# Patient Record
Sex: FEMALE | Race: WHITE | Employment: UNEMPLOYED | URBAN - METROPOLITAN AREA
[De-identification: names, ages, dates, MRNs, and addresses within clinical notes are randomized per-mention and may not be internally consistent; named-entity substitution may affect disease eponyms.]

---

## 2019-04-29 ENCOUNTER — HOSPITAL ENCOUNTER (INPATIENT)
Facility: CLINIC | Age: 54
LOS: 11 days | Discharge: NURSING, REHAB OR LONG-TERM CARE | DRG: 885 | End: 2019-05-10
Attending: PSYCHIATRY & NEUROLOGY | Admitting: PSYCHIATRY & NEUROLOGY
Payer: COMMERCIAL

## 2019-04-29 ENCOUNTER — TELEPHONE (OUTPATIENT)
Dept: BEHAVIORAL HEALTH | Facility: CLINIC | Age: 54
End: 2019-04-29
Payer: COMMERCIAL

## 2019-04-29 DIAGNOSIS — F20.0 ACUTE EXACERBATION OF CHRONIC PARANOID SCHIZOPHRENIA (H): ICD-10-CM

## 2019-04-29 DIAGNOSIS — F20.0 PARANOID SCHIZOPHRENIA (H): Primary | ICD-10-CM

## 2019-04-29 PROBLEM — F29 PSYCHOSIS (H): Status: ACTIVE | Noted: 2019-04-29

## 2019-04-29 LAB
ALBUMIN SERPL-MCNC: 3.6 G/DL (ref 3.4–5)
ALBUMIN UR-MCNC: NEGATIVE MG/DL
ALP SERPL-CCNC: 86 U/L (ref 40–150)
ALT SERPL W P-5'-P-CCNC: 18 U/L (ref 0–50)
AMPHETAMINES UR QL SCN: NEGATIVE
ANION GAP SERPL CALCULATED.3IONS-SCNC: 10 MMOL/L (ref 3–14)
APPEARANCE UR: CLEAR
AST SERPL W P-5'-P-CCNC: 13 U/L (ref 0–45)
BARBITURATES UR QL: NEGATIVE
BASOPHILS # BLD AUTO: 0 10E9/L (ref 0–0.2)
BASOPHILS NFR BLD AUTO: 0.2 %
BENZODIAZ UR QL: NEGATIVE
BILIRUB SERPL-MCNC: 0.6 MG/DL (ref 0.2–1.3)
BILIRUB UR QL STRIP: NEGATIVE
BUN SERPL-MCNC: 14 MG/DL (ref 7–30)
CALCIUM SERPL-MCNC: 9.1 MG/DL (ref 8.5–10.1)
CANNABINOIDS UR QL SCN: NEGATIVE
CHLORIDE SERPL-SCNC: 102 MMOL/L (ref 94–109)
CO2 SERPL-SCNC: 28 MMOL/L (ref 20–32)
COCAINE UR QL: NEGATIVE
COLOR UR AUTO: YELLOW
CREAT SERPL-MCNC: 0.59 MG/DL (ref 0.52–1.04)
DIFFERENTIAL METHOD BLD: ABNORMAL
EOSINOPHIL # BLD AUTO: 0 10E9/L (ref 0–0.7)
EOSINOPHIL NFR BLD AUTO: 0.2 %
ERYTHROCYTE [DISTWIDTH] IN BLOOD BY AUTOMATED COUNT: 14.2 % (ref 10–15)
ETHANOL UR QL SCN: NEGATIVE
GFR SERPL CREATININE-BSD FRML MDRD: >90 ML/MIN/{1.73_M2}
GLUCOSE SERPL-MCNC: 101 MG/DL (ref 70–99)
GLUCOSE UR STRIP-MCNC: NEGATIVE MG/DL
HCT VFR BLD AUTO: 38.7 % (ref 35–47)
HGB BLD-MCNC: 12.6 G/DL (ref 11.7–15.7)
HGB UR QL STRIP: ABNORMAL
IMM GRANULOCYTES # BLD: 0 10E9/L (ref 0–0.4)
IMM GRANULOCYTES NFR BLD: 0.2 %
KETONES UR STRIP-MCNC: NEGATIVE MG/DL
LEUKOCYTE ESTERASE UR QL STRIP: ABNORMAL
LYMPHOCYTES # BLD AUTO: 2.1 10E9/L (ref 0.8–5.3)
LYMPHOCYTES NFR BLD AUTO: 16.2 %
MCH RBC QN AUTO: 30.4 PG (ref 26.5–33)
MCHC RBC AUTO-ENTMCNC: 32.6 G/DL (ref 31.5–36.5)
MCV RBC AUTO: 94 FL (ref 78–100)
MONOCYTES # BLD AUTO: 1.1 10E9/L (ref 0–1.3)
MONOCYTES NFR BLD AUTO: 9 %
MUCOUS THREADS #/AREA URNS LPF: PRESENT /LPF
NEUTROPHILS # BLD AUTO: 9.5 10E9/L (ref 1.6–8.3)
NEUTROPHILS NFR BLD AUTO: 74.2 %
NITRATE UR QL: NEGATIVE
NRBC # BLD AUTO: 0 10*3/UL
NRBC BLD AUTO-RTO: 0 /100
OPIATES UR QL SCN: NEGATIVE
PH UR STRIP: 6.5 PH (ref 5–7)
PLATELET # BLD AUTO: 399 10E9/L (ref 150–450)
POTASSIUM SERPL-SCNC: 3.9 MMOL/L (ref 3.4–5.3)
PROT SERPL-MCNC: 7.7 G/DL (ref 6.8–8.8)
RBC # BLD AUTO: 4.14 10E12/L (ref 3.8–5.2)
RBC #/AREA URNS AUTO: 4 /HPF (ref 0–2)
SODIUM SERPL-SCNC: 140 MMOL/L (ref 133–144)
SOURCE: ABNORMAL
SP GR UR STRIP: 1.01 (ref 1–1.03)
SQUAMOUS #/AREA URNS AUTO: 1 /HPF (ref 0–1)
TSH SERPL DL<=0.005 MIU/L-ACNC: 0.54 MU/L (ref 0.4–4)
UROBILINOGEN UR STRIP-MCNC: 2 MG/DL (ref 0–2)
WBC # BLD AUTO: 12.7 10E9/L (ref 4–11)
WBC #/AREA URNS AUTO: 4 /HPF (ref 0–5)

## 2019-04-29 PROCEDURE — 25000132 ZZH RX MED GY IP 250 OP 250 PS 637: Performed by: PSYCHIATRY & NEUROLOGY

## 2019-04-29 PROCEDURE — 80320 DRUG SCREEN QUANTALCOHOLS: CPT | Performed by: PSYCHIATRY & NEUROLOGY

## 2019-04-29 PROCEDURE — A9270 NON-COVERED ITEM OR SERVICE: HCPCS | Performed by: PSYCHIATRY & NEUROLOGY

## 2019-04-29 PROCEDURE — 99285 EMERGENCY DEPT VISIT HI MDM: CPT | Mod: Z6 | Performed by: PSYCHIATRY & NEUROLOGY

## 2019-04-29 PROCEDURE — 36415 COLL VENOUS BLD VENIPUNCTURE: CPT | Performed by: PSYCHIATRY & NEUROLOGY

## 2019-04-29 PROCEDURE — 80053 COMPREHEN METABOLIC PANEL: CPT | Performed by: PSYCHIATRY & NEUROLOGY

## 2019-04-29 PROCEDURE — 84443 ASSAY THYROID STIM HORMONE: CPT | Performed by: PSYCHIATRY & NEUROLOGY

## 2019-04-29 PROCEDURE — 99285 EMERGENCY DEPT VISIT HI MDM: CPT | Mod: 25 | Performed by: PSYCHIATRY & NEUROLOGY

## 2019-04-29 PROCEDURE — 81001 URINALYSIS AUTO W/SCOPE: CPT | Performed by: PSYCHIATRY & NEUROLOGY

## 2019-04-29 PROCEDURE — 12400001 ZZH R&B MH UMMC

## 2019-04-29 PROCEDURE — 85025 COMPLETE CBC W/AUTO DIFF WBC: CPT | Performed by: PSYCHIATRY & NEUROLOGY

## 2019-04-29 PROCEDURE — 80307 DRUG TEST PRSMV CHEM ANLYZR: CPT | Performed by: PSYCHIATRY & NEUROLOGY

## 2019-04-29 PROCEDURE — 87591 N.GONORRHOEAE DNA AMP PROB: CPT | Performed by: PSYCHIATRY & NEUROLOGY

## 2019-04-29 PROCEDURE — 90791 PSYCH DIAGNOSTIC EVALUATION: CPT

## 2019-04-29 PROCEDURE — 87491 CHLMYD TRACH DNA AMP PROBE: CPT | Performed by: PSYCHIATRY & NEUROLOGY

## 2019-04-29 RX ORDER — ALUMINA, MAGNESIA, AND SIMETHICONE 2400; 2400; 240 MG/30ML; MG/30ML; MG/30ML
30 SUSPENSION ORAL EVERY 4 HOURS PRN
Status: DISCONTINUED | OUTPATIENT
Start: 2019-04-29 | End: 2019-05-10 | Stop reason: HOSPADM

## 2019-04-29 RX ORDER — NICOTINE 21 MG/24HR
1 PATCH, TRANSDERMAL 24 HOURS TRANSDERMAL DAILY
Status: DISCONTINUED | OUTPATIENT
Start: 2019-04-29 | End: 2019-05-10 | Stop reason: HOSPADM

## 2019-04-29 RX ORDER — OLANZAPINE 10 MG/2ML
5-10 INJECTION, POWDER, FOR SOLUTION INTRAMUSCULAR
Status: DISCONTINUED | OUTPATIENT
Start: 2019-04-29 | End: 2019-05-07

## 2019-04-29 RX ORDER — HYDROXYZINE HYDROCHLORIDE 25 MG/1
25 TABLET, FILM COATED ORAL EVERY 4 HOURS PRN
Status: DISCONTINUED | OUTPATIENT
Start: 2019-04-29 | End: 2019-05-07

## 2019-04-29 RX ORDER — ACETAMINOPHEN 325 MG/1
650 TABLET ORAL EVERY 4 HOURS PRN
Status: DISCONTINUED | OUTPATIENT
Start: 2019-04-29 | End: 2019-05-10 | Stop reason: HOSPADM

## 2019-04-29 RX ORDER — OLANZAPINE 10 MG/1
10 TABLET, ORALLY DISINTEGRATING ORAL ONCE
Status: COMPLETED | OUTPATIENT
Start: 2019-04-29 | End: 2019-04-29

## 2019-04-29 RX ORDER — BISACODYL 10 MG
10 SUPPOSITORY, RECTAL RECTAL DAILY PRN
Status: DISCONTINUED | OUTPATIENT
Start: 2019-04-29 | End: 2019-05-10 | Stop reason: HOSPADM

## 2019-04-29 RX ORDER — ALPRAZOLAM 0.5 MG
0.5 TABLET ORAL 3 TIMES DAILY PRN
Status: ON HOLD | COMMUNITY
End: 2019-05-09

## 2019-04-29 RX ORDER — NICOTINE 21 MG/24HR
1 PATCH, TRANSDERMAL 24 HOURS TRANSDERMAL ONCE
Status: COMPLETED | OUTPATIENT
Start: 2019-04-29 | End: 2019-04-29

## 2019-04-29 RX ORDER — TRAZODONE HYDROCHLORIDE 50 MG/1
50 TABLET, FILM COATED ORAL
Status: DISCONTINUED | OUTPATIENT
Start: 2019-04-29 | End: 2019-05-10 | Stop reason: HOSPADM

## 2019-04-29 RX ORDER — OLANZAPINE 5 MG/1
5-10 TABLET ORAL
Status: DISCONTINUED | OUTPATIENT
Start: 2019-04-29 | End: 2019-05-07

## 2019-04-29 RX ORDER — POLYETHYLENE GLYCOL 3350 17 G
2 POWDER IN PACKET (EA) ORAL
Status: DISCONTINUED | OUTPATIENT
Start: 2019-04-29 | End: 2019-04-29

## 2019-04-29 RX ORDER — POLYETHYLENE GLYCOL 3350 17 G
2 POWDER IN PACKET (EA) ORAL
Status: DISCONTINUED | OUTPATIENT
Start: 2019-04-29 | End: 2019-05-10 | Stop reason: HOSPADM

## 2019-04-29 RX ADMIN — NICOTINE 1 PATCH: 21 PATCH, EXTENDED RELEASE TRANSDERMAL at 16:21

## 2019-04-29 RX ADMIN — OLANZAPINE 10 MG: 5 TABLET, FILM COATED ORAL at 20:37

## 2019-04-29 RX ADMIN — OLANZAPINE 10 MG: 10 TABLET, ORALLY DISINTEGRATING ORAL at 15:30

## 2019-04-29 SDOH — HEALTH STABILITY: MENTAL HEALTH: HOW OFTEN DO YOU HAVE A DRINK CONTAINING ALCOHOL?: NEVER

## 2019-04-29 ASSESSMENT — ACTIVITIES OF DAILY LIVING (ADL)
DRESS: INDEPENDENT
AMBULATION: 0-->INDEPENDENT
LAUNDRY: WITH SUPERVISION
TRANSFERRING: 0-->INDEPENDENT
HYGIENE/GROOMING: INDEPENDENT
TOILETING: 0-->INDEPENDENT
SWALLOWING: 0-->SWALLOWS FOODS/LIQUIDS WITHOUT DIFFICULTY
RETIRED_EATING: 0-->INDEPENDENT
DRESS: 0-->INDEPENDENT
ORAL_HYGIENE: INDEPENDENT
COGNITION: 2 - DIFFICULTY WITH ORGANIZING THOUGHTS
BATHING: 0-->INDEPENDENT
RETIRED_COMMUNICATION: 3-->UNABLE TO UNDERSTAND OR SPEAK (NOT RELATED TO LANGUAGE BARRIER)
FALL_HISTORY_WITHIN_LAST_SIX_MONTHS: NO
WHICH_OF_THE_ABOVE_FUNCTIONAL_RISKS_HAD_A_RECENT_ONSET_OR_CHANGE?: COMMUNICATION/SPEECH;COGNITION

## 2019-04-29 ASSESSMENT — MIFFLIN-ST. JEOR: SCORE: 1047.67

## 2019-04-29 ASSESSMENT — ENCOUNTER SYMPTOMS
RESPIRATORY NEGATIVE: 1
NEUROLOGICAL NEGATIVE: 1
NERVOUS/ANXIOUS: 1
APPETITE CHANGE: 1
ENDOCRINE NEGATIVE: 1
SLEEP DISTURBANCE: 1
ACTIVITY CHANGE: 1
HEMATOLOGIC/LYMPHATIC NEGATIVE: 1
DECREASED CONCENTRATION: 1
EYES NEGATIVE: 1
MUSCULOSKELETAL NEGATIVE: 1
CARDIOVASCULAR NEGATIVE: 1
GASTROINTESTINAL NEGATIVE: 1
HALLUCINATIONS: 1

## 2019-04-29 NOTE — ED NOTES
ED to Behavioral Floor Handoff    SITUATION  Maryan Goldstein is a 53 year old female who speaks English and lives is homeless unknown The patient arrived in the ED by ambulance [facility] with a complaint of Paranoid (Hearing voices) and Mental Health Problem  .The patient's current symptoms started/worsened an unknown time ago pt found outside of a [employer] and just moved here from [place].   In the ED, pt was diagnosed with   Final diagnoses:   Acute exacerbation of chronic paranoid schizophrenia (H)        Initial vitals were: BP: 111/65  Pulse: 114  Temp: 97.7  F (36.5  C)  Resp: 16  SpO2: 98 %   --------  Is the patient diabetic? No   If yes, last blood glucose? --     If yes, was this treated in the ED? --  --------  Is the patient inebriated (ETOH) No or Impaired on other substances? No  MSSA done? N/A  Last MSSA score: --    Were withdrawal symptoms treated? N/A  Does the patient have a seizure history? No. If yes, date of most recent seizure--  --------  Is the patient patient experiencing suicidal ideation? Refuses to answer at this time  Homicidal ideation? denies current or recent homicidal ideation or behaviors.    Self-injurious behavior/urges? denies current or recent self injurious behavior or ideation.  ------  Was pt aggressive in the ED No  Was a code called No  Is the pt now cooperative? Yes  -------  Meds given in ED:   Medications   OLANZapine zydis (zyPREXA) ODT tab 10 mg (10 mg Oral Given 4/29/19 1240)      Family present during ED course? No  Family currently present? No    BACKGROUND  Does the patient have a cognitive impairment or developmental disability? Yes  Allergies: No Known Allergies.   Social demographics are   Social History     Socioeconomic History     Marital status: Single     Spouse name: None     Number of children: None     Years of education: None     Highest education level: None   Occupational History     None   Social Needs     Financial resource strain: None      Food insecurity:     Worry: None     Inability: None     Transportation needs:     Medical: None     Non-medical: None   Tobacco Use     Smoking status: Current Every Day Smoker     Packs/day: 2.00     Smokeless tobacco: Current User   Substance and Sexual Activity     Alcohol use: Never     Frequency: Never     Drug use: Never     Sexual activity: Never   Lifestyle     Physical activity:     Days per week: None     Minutes per session: None     Stress: None   Relationships     Social connections:     Talks on phone: None     Gets together: None     Attends Mosque service: None     Active member of club or organization: None     Attends meetings of clubs or organizations: None     Relationship status: None     Intimate partner violence:     Fear of current or ex partner: None     Emotionally abused: None     Physically abused: None     Forced sexual activity: None   Other Topics Concern     None   Social History Narrative     None        ASSESSMENT  Labs results   Labs Ordered and Resulted from Time of ED Arrival Up to the Time of Departure from the ED   DRUG ABUSE SCREEN 6 CHEM DEP URINE ([facility])   CBC WITH PLATELETS DIFFERENTIAL   COMPREHENSIVE METABOLIC PANEL   TSH WITH FREE T4 REFLEX      Imaging Studies: No results found for this or any previous visit (from the past 24 hour(s)).   Most recent vital signs /65   Pulse 114   Temp 97.7  F (36.5  C) (Oral)   Resp 16   SpO2 98%    Abnormal labs/tests/findings requiring intervention:---   Pain control: pt had none  Nausea control: pt had none    RECOMMENDATION  Are any infection precautions needed (MRSA, VRE, etc.)? No If yes, what infection? --  ---  Does the patient have mobility issues? independently. If yes, what device does the pt use? ---  ---  Is patient on 72 hour hold or commitment? No If on 72 hour hold, have hold and rights been given to patient? N/A  Are admitting orders written if after 10 p.m. ?N/A  Tasks needing to be completed:---      Nola Call   asc--    [phone]   [phone]

## 2019-04-29 NOTE — ED NOTES
Bed: HW01  Expected date: 4/29/19  Expected time: 1:18 PM  Means of arrival:   Comments:  [facility]  53 female, homeless, off psych meds x4 months
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
## 2019-04-29 NOTE — PROGRESS NOTES
04/29/19 2930   Patient Belongings   Did you bring any home meds/supplements to the hospital?  No   Patient Belongings locker;sent to security per site process   Patient Belongings Put in Hospital Secure Location (Security or Locker, etc.) cash/credit card;laptop computer;shoes;wallet;keys;clothing;cell phone/electronics   Belongings Search Yes   Clothing Search Yes   Second Staff Regina     Security envelope [id number]     Visa chime debit visa  Visa debit global cash card  Escape membership  Direct express mastercard  paypal business debit mastercard  Social security card  [place]  license    Locker 18  Jeans,belt  Property envelope - green pencil bag - with 3 lighters and metal gel pens / door lock / box - derrek ledger  Plastic bag - black jacket  ( vitamins in pocket)/ blue jacket with string  Plastic bag - rain boots  Back pack - laptop, amazon tablet, , misc papers and folders, many journals with spirals  Side pocket- cracked cell phone , / side pocket ear wax remover , keys , wallet ( misc cards), lotions, dentures, brush          .               IN PATIENT ROOM:     IN PATIENT LOCKER:     IN SECURITY:     ADMISSION:  I am responsible for any personal items that are not sent to the safe or pharmacy. [facility] is not responsible for loss, theft or damage of any property in my possession.    Patient Signature _____________________ Date/Time _____________________    Staff Signature _______________________ Date/Time _____________________  2nd Staff person, if patient is unable/unwilling to sign  ___________________________________ Date/Time _____________________  DISCHARGE:  All personal items have been returned to me.    Patient Signature _____________________ Date/Time _____________________    Staff Signature _______________________ Date/Time _____________________
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
## 2019-04-30 LAB
C TRACH DNA SPEC QL NAA+PROBE: NEGATIVE
CHOLEST SERPL-MCNC: 145 MG/DL
HDLC SERPL-MCNC: 43 MG/DL
HIV 1+2 AB+HIV1 P24 AG SERPL QL IA: NONREACTIVE
LDLC SERPL CALC-MCNC: 91 MG/DL
N GONORRHOEA DNA SPEC QL NAA+PROBE: NEGATIVE
NONHDLC SERPL-MCNC: 102 MG/DL
SPECIMEN SOURCE: NORMAL
SPECIMEN SOURCE: NORMAL
TRIGL SERPL-MCNC: 53 MG/DL

## 2019-04-30 PROCEDURE — 12400001 ZZH R&B MH UMMC

## 2019-04-30 PROCEDURE — 36415 COLL VENOUS BLD VENIPUNCTURE: CPT | Performed by: PSYCHIATRY & NEUROLOGY

## 2019-04-30 PROCEDURE — 87389 HIV-1 AG W/HIV-1&-2 AB AG IA: CPT | Performed by: PSYCHIATRY & NEUROLOGY

## 2019-04-30 PROCEDURE — 80061 LIPID PANEL: CPT | Performed by: PSYCHIATRY & NEUROLOGY

## 2019-04-30 PROCEDURE — 25000132 ZZH RX MED GY IP 250 OP 250 PS 637: Performed by: PSYCHIATRY & NEUROLOGY

## 2019-04-30 PROCEDURE — 99223 1ST HOSP IP/OBS HIGH 75: CPT | Mod: AI | Performed by: PSYCHIATRY & NEUROLOGY

## 2019-04-30 RX ORDER — MULTIPLE VITAMINS W/ MINERALS TAB 9MG-400MCG
1 TAB ORAL DAILY
Status: DISCONTINUED | OUTPATIENT
Start: 2019-04-30 | End: 2019-05-10 | Stop reason: HOSPADM

## 2019-04-30 RX ADMIN — MULTIPLE VITAMINS W/ MINERALS TAB 1 TABLET: TAB at 13:15

## 2019-04-30 RX ADMIN — BENZOCAINE, MENTHOL 1 LOZENGE: 15; 3.6 LOZENGE ORAL at 01:37

## 2019-04-30 RX ADMIN — BENZOCAINE, MENTHOL 1 LOZENGE: 15; 3.6 LOZENGE ORAL at 20:57

## 2019-04-30 RX ADMIN — NICOTINE 1 PATCH: 21 PATCH, EXTENDED RELEASE TRANSDERMAL at 09:12

## 2019-04-30 RX ADMIN — OLANZAPINE 10 MG: 5 TABLET, FILM COATED ORAL at 07:06

## 2019-04-30 RX ADMIN — OLANZAPINE 15 MG: 10 TABLET, FILM COATED ORAL at 20:54

## 2019-04-30 RX ADMIN — OLANZAPINE 10 MG: 5 TABLET, FILM COATED ORAL at 16:47

## 2019-04-30 RX ADMIN — BENZOCAINE, MENTHOL 1 LOZENGE: 15; 3.6 LOZENGE ORAL at 16:48

## 2019-04-30 ASSESSMENT — MIFFLIN-ST. JEOR: SCORE: 1047.67
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
## 2019-04-30 NOTE — PROGRESS NOTES
04/30/19 1500   Behavioral Health   Affect full range affect   Mood mood is calm   Activity other (see comment)  (Slept most of the shift.)
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
## 2019-04-30 NOTE — PROGRESS NOTES
SPIRITUAL HEALTH SERVICES  SPIRITUAL ASSESSMENT Progress Note  [facility] ([facility]) Station 30    REFERRAL SOURCE: I did visit this morning patient Maryan twice but in my both visit attempts pt was in a deep sleep. I have been informed by the unit staff that the pt is new and so tired. So I let the pt to sleep and left her room quietly.     PLAN: I will remain open to provide spiritual care for the pt as needed.    Maribell Sanchez M.Div. ([place]), M.Th., D.Min., [facility]  Staff   Pager [phone]
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
## 2019-04-30 NOTE — PROGRESS NOTES
"CLINICAL NUTRITION SERVICES - ASSESSMENT NOTE     Nutrition Prescription    RECOMMENDATIONS FOR MDs/PROVIDERS TO ORDER:  Recommend MD write prescription for Boost/Ensure to help pt maintain/gain weight outpatient. Discussed situation with  as well to see if insurance would cover Boost prescription as well as provide info on community resources to establish greater food security.     Malnutrition Status:    Severe malnutrition in the context of acute on chronic illness based on information available    Recommendations already ordered by Registered Dietitian (RD):  1. Thera-Vit-M  2. Boost Plus Strawberry with meals    Future/Additional Recommendations:  Monitor PO/wt trends.      REASON FOR ASSESSMENT  Maryan Goldstein is a/an 53 year old female assessed by the dietitian for RN Consult - Pt has had appetite change with recent paranoia, homeless and inconsistant access to food. Underweight.    PMH/Reason for Admission: Acute exacerbation of chronic paranoid schizophrenia.    NUTRITION HISTORY  Pt reports eating 3 meals daily when at homeless shelter in [place] since December 8th, 2018, but losing wt despite this. She moved back to [place] about 5 days ago and has not had consistent meal resources. Pt thought process seemed logical and clear; however, pt is noted to still be experiencing delusions per unit staff. Unsure if pt is accurate historian at this time.     CURRENT NUTRITION ORDERS  Diet: Regular  Intake/Tolerance: Reports appetite is good. Wanted Strawberry Boost with meals.     LABS  Labs reviewed  -Ketones negative upon admit on 4/29  -Labs otherwise unremarkable    MEDICATIONS  Medications reviewed  -Zyprexa    ANTHROPOMETRICS  Height: 162.6 cm (5' 4\")  Most Recent Weight: 46.3 kg (102 lb) on 4/30/19    IBW: 54.5 kg   BMI: 17.51 kg/m2; Underweight BMI <18.5  Weight History: No wt hx. Pt believes has lost some weight, but could not quantify. Reports she feels best when she is 120-130 lbs, but years ago " she used to weight 175 lbs.  Wt Readings from Last 20 Encounters:   04/30/19 46.3 kg (102 lb)   Dosing Weight: 46 kg (actual, based on most recent body wt of 46.3 kg on 4/30/19)    ASSESSED NUTRITION NEEDS  Estimated Energy Needs: 0992-4309 kcals/day (30 - 35+ kcals/kg )  Justification: Repletion and Underweight  Estimated Protein Needs: 55-69 grams protein/day (1.2 - 1.5 grams of pro/kg)  Justification: Repletion  Estimated Fluid Needs: 1 mL/kcal   Justification: Maintenance    PHYSICAL FINDINGS  See malnutrition section below.  -Poor dentition (endentulous)   -Dry skin    MALNUTRITION  % Intake: Decreased intake does not meet criteria  % Weight Loss: Unable to assess  Subcutaneous Fat Loss: Facial region: Moderate-severe (based on visual, pt sitting in bed wearing baggy sweater)  Muscle Loss: Temporal, Facial & jaw region and Dorsal hand: Severe  (based on visual)  Fluid Accumulation/Edema: None noted  Malnutrition Diagnosis: Severe malnutrition in the context of acute on chronic illness based on information available    NUTRITION DIAGNOSIS  Underweight related to food insecurity in setting of homelessness as evidenced by pt report of trouble maintaining weight, still feeling hungry after meals served in shelters, and BMI of 17.51 kg/m2.    INTERVENTIONS  Implementation  -Nutrition Education: Provided education on potential for Boost/Ensure prescription. Discussed that social work would be better reference for community resources to help improve food security.    -Medical food supplement therapy: Boost with meals  -Multivitamin/mineral supplement therapy: Thera-Vit-M  -Collaboration with other providers: Discussed pt with SW to offer places pt could visit for more stable food access. Also discussed pt was requesting to see if Boost/Ensure prescription would be covered under her insurance.    Goals  Patient to consume % of nutritionally adequate meal trays TID, or the equivalent with supplements/snacks.      Monitoring/Evaluation  Progress toward goals will be monitored and evaluated per protocol.    Honey Sheets RD, LD  Pager: [phone]

## 2019-05-01 PROCEDURE — 25000132 ZZH RX MED GY IP 250 OP 250 PS 637: Performed by: PSYCHIATRY & NEUROLOGY

## 2019-05-01 PROCEDURE — 12400001 ZZH R&B MH UMMC

## 2019-05-01 PROCEDURE — G0177 OPPS/PHP; TRAIN & EDUC SERV: HCPCS

## 2019-05-01 RX ADMIN — OLANZAPINE 15 MG: 10 TABLET, FILM COATED ORAL at 20:04

## 2019-05-01 RX ADMIN — BENZOCAINE, MENTHOL 1 LOZENGE: 15; 3.6 LOZENGE ORAL at 20:04

## 2019-05-01 RX ADMIN — NICOTINE 1 PATCH: 21 PATCH, EXTENDED RELEASE TRANSDERMAL at 08:33

## 2019-05-01 RX ADMIN — MULTIPLE VITAMINS W/ MINERALS TAB 1 TABLET: TAB at 08:33

## 2019-05-01 RX ADMIN — OLANZAPINE 5 MG: 5 TABLET, FILM COATED ORAL at 09:27

## 2019-05-01 RX ADMIN — OLANZAPINE 5 MG: 5 TABLET, FILM COATED ORAL at 18:45

## 2019-05-01 ASSESSMENT — ACTIVITIES OF DAILY LIVING (ADL)
DRESS: INDEPENDENT
LAUNDRY: WITH SUPERVISION
ORAL_HYGIENE: INDEPENDENT
HYGIENE/GROOMING: INDEPENDENT

## 2019-05-01 NOTE — PROGRESS NOTES
"Pt was visible in the milieu in the morning, and napped in the afternoon. Pt attended groups and participated. Pt denies SI/SIB, hallucinations, and medication side effects. Pt had questions regarding homeless shelters, specifically a place called [facility]. When questioned about anxiety and depression she stated \"I think so\".     05/01/19 1400   Behavioral Health   Hallucinations auditory;appears responding   Thinking distractable;poor concentration   Orientation person: oriented;place: oriented   Memory confabulation   Insight poor   Judgement impaired   Eye Contact staring;at examiner   Affect blunted, flat   Mood mood is calm   Physical Appearance/Attire attire appropriate to age and situation   Hygiene well groomed   Suicidality other (see comments)  (denies)   1. Wish to be Dead No   2. Non-Specific Active Suicidal Thoughts  No   Self Injury other (see comment)  (denies)   Activity withdrawn   Speech coherent   Medication Sensitivity no stated side effects;no observed side effects   Psychomotor / Gait balanced;steady   Psycho Education   Type of Intervention 1:1 intervention   Response participates, initiates socially appropriate   Hours 0.5   Treatment Detail check in    Activities of Daily Living   Hygiene/Grooming independent   Oral Hygiene independent   Dress independent   Laundry with supervision   Room Organization independent   Activity   Activity Assistance Provided independent     "

## 2019-05-01 NOTE — PROGRESS NOTES
Behavioral Health  Note   Behavioral Health  Spirituality Group Note     Unit 30    Name: Maryan Goldstein    YOB: 1965   MRN: [id number]    Age: 53 year old     Patient attended -led group, which included discussion of spirituality, coping with illness and building resilience.   Patient attended group for 1.0 hrs.   The patient actively participated in group discussion     Maribell [facility]  Staff    Page [phone]

## 2019-05-01 NOTE — PROGRESS NOTES
"Patient ran up to this writer and was very tearful . She was crying and stating \"My eyes are being assaulted by visual hallucinations.\" Maryan was very distressed. She asked for medication and was given zyprexa 10 mg orally. Maryan stated that \"When I tried it before it really helped.\"  "

## 2019-05-01 NOTE — PROGRESS NOTES
Pt slept most all of the shift. She appears responding to  and wants to talk to her  tomorrow to ask about possible job opportunities available upon discharge. Pt appears confused and disorganized as well as nervous or scared.      04/30/19 2055   Behavioral Health   Hallucinations appears responding;auditory   Thinking poor concentration;confused   Orientation person: oriented;place: oriented   Memory confabulation   Insight poor   Judgement impaired   Eye Contact staring;at examiner   Affect tense   Mood mood is calm   Physical Appearance/Attire disheveled   Hygiene well groomed   1. Wish to be Dead No   2. Non-Specific Active Suicidal Thoughts  No

## 2019-05-02 PROCEDURE — 99232 SBSQ HOSP IP/OBS MODERATE 35: CPT | Performed by: PSYCHIATRY & NEUROLOGY

## 2019-05-02 PROCEDURE — 25000132 ZZH RX MED GY IP 250 OP 250 PS 637: Performed by: PSYCHIATRY & NEUROLOGY

## 2019-05-02 PROCEDURE — G0177 OPPS/PHP; TRAIN & EDUC SERV: HCPCS

## 2019-05-02 PROCEDURE — 12400001 ZZH R&B MH UMMC

## 2019-05-02 PROCEDURE — H2032 ACTIVITY THERAPY, PER 15 MIN: HCPCS

## 2019-05-02 RX ORDER — OLANZAPINE 10 MG/1
10 TABLET ORAL DAILY
Status: DISCONTINUED | OUTPATIENT
Start: 2019-05-02 | End: 2019-05-03

## 2019-05-02 RX ADMIN — BENZOCAINE, MENTHOL 1 LOZENGE: 15; 3.6 LOZENGE ORAL at 13:27

## 2019-05-02 RX ADMIN — OLANZAPINE 15 MG: 10 TABLET, FILM COATED ORAL at 20:16

## 2019-05-02 RX ADMIN — BENZOCAINE, MENTHOL 1 LOZENGE: 15; 3.6 LOZENGE ORAL at 20:17

## 2019-05-02 RX ADMIN — NICOTINE 1 PATCH: 21 PATCH, EXTENDED RELEASE TRANSDERMAL at 08:14

## 2019-05-02 RX ADMIN — MULTIPLE VITAMINS W/ MINERALS TAB 1 TABLET: TAB at 08:13

## 2019-05-02 RX ADMIN — OLANZAPINE 10 MG: 10 TABLET, FILM COATED ORAL at 13:26

## 2019-05-02 ASSESSMENT — ACTIVITIES OF DAILY LIVING (ADL)
HYGIENE/GROOMING: INDEPENDENT
LAUNDRY: WITH SUPERVISION
HYGIENE/GROOMING: INDEPENDENT
LAUNDRY: WITH SUPERVISION
ORAL_HYGIENE: INDEPENDENT
ORAL_HYGIENE: INDEPENDENT
DRESS: SCRUBS (BEHAVIORAL HEALTH);INDEPENDENT
DRESS: SCRUBS (BEHAVIORAL HEALTH)

## 2019-05-02 ASSESSMENT — MIFFLIN-ST. JEOR: SCORE: 1056.74
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
## 2019-05-02 NOTE — PLAN OF CARE
"Date of group 5/1    Patient attended OT clinic and health and coping cards. In OT clinic, she required assistance for initiation and set up of task. She agreed to do beading and listened and observed instruction. She was slow to start and required assistance for problem solving, but ultimately created a detailed pattern that she felt accomplished and proud of. She required assistance for accurate use of crimp tool. She demonstrated some word finding difficulties with verbal expression. She attended afternoon group with discussion on coping and structured activity to promote insight and follow through of development of healthy coping skills. Patient demonstrated limited organization, insight, and awareness of personal barriers and ways to cope with them. She made 1 card and included hearing voices as item to cope with and wrote \"breathing exercises and [address] grounding exercise\".     5/2    Patient attended OT clinic and topic group making gratitude collages. She participated in discussion about gratitude, brainstorming subjects of gratitude to facilitate collage-making. She initially struggled to identify a personal trait she is grateful for, she said \"I need people.\" When asked to expand she said \"I'm not a loner.\" She portrayed some paranoid ideas and after several attempts to reframe the question, she identified sincerity. She remained focused on collage-making task and her images remained relevant to theme. She shared her selected images and composition which she completed with care. She independently asserted and used materials to embellish. She worked on collage task during OT clinic.   "

## 2019-05-02 NOTE — PLAN OF CARE
"48 hr. Nursing Assessment    Maryan has been visible on the unit this evening. She did attend the Music Group and informed this writer that \"I found it very stressful.\" \"The Rock and Roll Music is too loud.\" She appeared very tense and concerned. She constantly removed her glasses and then placed the glasses back on her face. She then stated \"A male peer on the unit gave her the name of his [place]  [place]) and she feels she needs one so \"Ky is going to introduce her to me tomorrow.\" Maryan was then reminded that \"Ky is a PATIENT here and not staff.\"  She has been encouraged not to take any recommendations from Ky. She also informed this writer that \"I want to go to [employer] in [facility], Ky told me they will help me find a job!\" I have encouraged Maryan to speak to Suzie her CTC in the AM. Maryan is disorganized in her thoughts. She is hoping to change her zyprexa to 5mg BID to enable the Zyprexa to be more \"Dispersed during the day and take 15mg at night.\" She ate dinner and has spent the rest of the evening in her room. She is \"Afaid\" of the men watching television in the [facility] this evening. She has been medication compliant.  "
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
## 2019-05-02 NOTE — PLAN OF CARE
"Pt has been doing well.  She was visible in the dayroom.  She is pleasant upon approach.  States she is sleeping \"better than I have ever slept\"  She does complain about a sensation in her vaginal area and asked for cleansing towelettes. When asked about depression states \"that's probably why I'm so angry.\"  She does not appear angry.  Affect is blunted.  She is concerned about placement.  \"There is nothing in [place].  Nothing.  That's why [facility].  I need a place to live\"  Did ask for PRN Zyprexa this afternoon because she was \"being assaulted by her hallucinations\".  Denies suicidal thoughts.  "

## 2019-05-03 PROCEDURE — 25000132 ZZH RX MED GY IP 250 OP 250 PS 637: Performed by: PSYCHIATRY & NEUROLOGY

## 2019-05-03 PROCEDURE — 99232 SBSQ HOSP IP/OBS MODERATE 35: CPT | Performed by: PSYCHIATRY & NEUROLOGY

## 2019-05-03 PROCEDURE — G0177 OPPS/PHP; TRAIN & EDUC SERV: HCPCS

## 2019-05-03 PROCEDURE — 12400001 ZZH R&B MH UMMC

## 2019-05-03 RX ORDER — OLANZAPINE 5 MG/1
5 TABLET ORAL 2 TIMES DAILY
Status: DISCONTINUED | OUTPATIENT
Start: 2019-05-03 | End: 2019-05-10 | Stop reason: HOSPADM

## 2019-05-03 RX ORDER — OLANZAPINE 10 MG/1
10 TABLET ORAL AT BEDTIME
Status: DISCONTINUED | OUTPATIENT
Start: 2019-05-03 | End: 2019-05-10 | Stop reason: HOSPADM

## 2019-05-03 RX ADMIN — HYDROXYZINE HYDROCHLORIDE 25 MG: 25 TABLET ORAL at 09:31

## 2019-05-03 RX ADMIN — OLANZAPINE 10 MG: 10 TABLET, FILM COATED ORAL at 21:07

## 2019-05-03 RX ADMIN — BENZOCAINE, MENTHOL 1 LOZENGE: 15; 3.6 LOZENGE ORAL at 18:47

## 2019-05-03 RX ADMIN — BENZOCAINE, MENTHOL 1 LOZENGE: 15; 3.6 LOZENGE ORAL at 21:07

## 2019-05-03 RX ADMIN — MULTIPLE VITAMINS W/ MINERALS TAB 1 TABLET: TAB at 08:14

## 2019-05-03 RX ADMIN — NICOTINE 1 PATCH: 21 PATCH, EXTENDED RELEASE TRANSDERMAL at 08:15

## 2019-05-03 RX ADMIN — HYDROXYZINE HYDROCHLORIDE 25 MG: 25 TABLET ORAL at 19:42

## 2019-05-03 RX ADMIN — OLANZAPINE 10 MG: 10 TABLET, FILM COATED ORAL at 08:14

## 2019-05-03 ASSESSMENT — ACTIVITIES OF DAILY LIVING (ADL)
HYGIENE/GROOMING: INDEPENDENT
HYGIENE/GROOMING: INDEPENDENT
ORAL_HYGIENE: INDEPENDENT
ORAL_HYGIENE: INDEPENDENT
DRESS: INDEPENDENT
DRESS: INDEPENDENT

## 2019-05-03 NOTE — PLAN OF CARE
Patient attended both OT groups. Topic group promoted self-care with discussion, structured worksheet, group braining storming and structred task to make an origami self-care box. Patient was engaged in discussions and made relevant points. She followed demonstration of steps for origami with assistance for most complex step. She required assistance for this step 2/4 times. She wanted to continue with origami in OT clinic and followed paper airplane instructions. She struggled to complete a paper airplane but remained persistent and did complete 1 after several attempts. She was pleased and asked for instructions to make the box again.

## 2019-05-03 NOTE — PLAN OF CARE
"48 hour nurse assess:  Patient is alert and oriented x 4. Denies any pain or discomfort. Denies any medical concerns. States that medications are working well because \" the voices are diminishing\". States no side effects from medications. Denies si/ sib. Endorsing some auditory hallucinations \" male and female voices  saying negative,hopeless things\"  would not specify what the voices are actually saying. Denies that voices are telling her to hurt self or others. Noted that she slept well last nite. Blood pressures low this morning, Md made changes to Zyprexa scheduling to help with bp fluctuations,  anxiety and depression at 6/10 per pt report. Prn hydroxyzine administered with good effect.Patient is progressing towards goals. Encourage participation in groups and developing healthy coping skills.Will continue  to work towards discharge goals.   "
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
## 2019-05-03 NOTE — PROGRESS NOTES
Case Management Note  5/3/2019    Writer attempted to contact patient's insurance ([place]). Was transferred to several people- behavioral health, behavioral health again, member services. Eventually ended up at member services for medicaid, this staff was going to transfer writer to member services for medicare. Then, writer spent about 45 minutes on hold. Writer was unable to figure out from insurance what behavioral services (IRTS, group home, etc.) may be covered.     Sherri Awad LPC, [facility]

## 2019-05-04 PROCEDURE — 25000132 ZZH RX MED GY IP 250 OP 250 PS 637: Performed by: PSYCHIATRY & NEUROLOGY

## 2019-05-04 PROCEDURE — 12400001 ZZH R&B MH UMMC

## 2019-05-04 RX ADMIN — BENZOCAINE, MENTHOL 1 LOZENGE: 15; 3.6 LOZENGE ORAL at 20:59

## 2019-05-04 RX ADMIN — OLANZAPINE 5 MG: 5 TABLET, FILM COATED ORAL at 08:35

## 2019-05-04 RX ADMIN — HYDROXYZINE HYDROCHLORIDE 25 MG: 25 TABLET ORAL at 11:48

## 2019-05-04 RX ADMIN — OLANZAPINE 10 MG: 10 TABLET, FILM COATED ORAL at 20:57

## 2019-05-04 RX ADMIN — OLANZAPINE 5 MG: 5 TABLET, FILM COATED ORAL at 14:24

## 2019-05-04 RX ADMIN — NICOTINE 1 PATCH: 21 PATCH, EXTENDED RELEASE TRANSDERMAL at 08:35

## 2019-05-04 RX ADMIN — MULTIPLE VITAMINS W/ MINERALS TAB 1 TABLET: TAB at 08:35

## 2019-05-04 RX ADMIN — BENZOCAINE, MENTHOL 1 LOZENGE: 15; 3.6 LOZENGE ORAL at 11:48

## 2019-05-04 ASSESSMENT — ACTIVITIES OF DAILY LIVING (ADL)
LAUNDRY: WITH SUPERVISION
DRESS: INDEPENDENT
HYGIENE/GROOMING: INDEPENDENT
ORAL_HYGIENE: INDEPENDENT
ORAL_HYGIENE: INDEPENDENT
DRESS: INDEPENDENT
HYGIENE/GROOMING: INDEPENDENT

## 2019-05-04 NOTE — PROGRESS NOTES
Pt approached writer at 2215 to request to have her vitals taken.  She said she was feeling hopeless and wondering if she was having side effects of her medications.  BP: 106/59, HR: 85 BPM.  Writer advised pt to drink more water and stand up slowly.  Pt was relieved to see that her blood pressure wasn't high.

## 2019-05-04 NOTE — PROGRESS NOTES
Pt denies SI/SIB. Pt reports voices - pt reports they are not violent and if they become she reads the bible. Pt was isolative to room.      05/04/19 1347   Behavioral Health   Hallucinations auditory   Thinking distractable;delusional;poor concentration   Orientation person: oriented   Memory confabulation   Insight poor   Judgement impaired   Affect blunted, flat   Mood mood is calm   Suicidality other (see comments)  (pt denies)   Self Injury other (see comment)  (pt denies)   Activity isolative;other (see comment)  (community meeting)   Activities of Daily Living   Hygiene/Grooming independent   Oral Hygiene independent   Dress independent   Room Organization independent

## 2019-05-04 NOTE — PROGRESS NOTES
Pt denies SI/SIB. Pt isolative but ate dinner.Pt stated she would like to go to [facility] and would like a [facility]  - this writer told pt she needed to speak with [facility] . Pt stated she could not concentrate and did not want to speak anymore.      05/03/19 8062   Behavioral Health   Hallucinations denies / not responding to hallucinations   Thinking paranoid;poor concentration   Orientation person: oriented;place: oriented   Memory baseline memory   Insight poor   Judgement impaired   Eye Contact at examiner   Affect blunted, flat   Mood anxious   Suicidality other (see comments)  (pt denies )   Activity withdrawn   Speech clear;coherent   Activities of Daily Living   Hygiene/Grooming independent   Oral Hygiene independent   Dress independent   Room Organization independent

## 2019-05-05 PROCEDURE — 25000132 ZZH RX MED GY IP 250 OP 250 PS 637: Performed by: PSYCHIATRY & NEUROLOGY

## 2019-05-05 PROCEDURE — 12400001 ZZH R&B MH UMMC

## 2019-05-05 RX ADMIN — OLANZAPINE 10 MG: 10 TABLET, FILM COATED ORAL at 20:27

## 2019-05-05 RX ADMIN — BENZOCAINE, MENTHOL 1 LOZENGE: 15; 3.6 LOZENGE ORAL at 20:27

## 2019-05-05 RX ADMIN — OLANZAPINE 5 MG: 5 TABLET, FILM COATED ORAL at 08:05

## 2019-05-05 RX ADMIN — TRAZODONE HYDROCHLORIDE 50 MG: 50 TABLET ORAL at 20:27

## 2019-05-05 RX ADMIN — HYDROXYZINE HYDROCHLORIDE 25 MG: 25 TABLET ORAL at 13:30

## 2019-05-05 RX ADMIN — MULTIPLE VITAMINS W/ MINERALS TAB 1 TABLET: TAB at 08:05

## 2019-05-05 RX ADMIN — TRAZODONE HYDROCHLORIDE 50 MG: 50 TABLET ORAL at 02:45

## 2019-05-05 RX ADMIN — BENZOCAINE, MENTHOL 1 LOZENGE: 15; 3.6 LOZENGE ORAL at 13:30

## 2019-05-05 RX ADMIN — BENZOCAINE, MENTHOL 1 LOZENGE: 15; 3.6 LOZENGE ORAL at 01:36

## 2019-05-05 RX ADMIN — NICOTINE 1 PATCH: 21 PATCH, EXTENDED RELEASE TRANSDERMAL at 08:05

## 2019-05-05 RX ADMIN — OLANZAPINE 5 MG: 5 TABLET, FILM COATED ORAL at 13:30

## 2019-05-05 ASSESSMENT — ACTIVITIES OF DAILY LIVING (ADL)
DRESS: SCRUBS (BEHAVIORAL HEALTH)
ORAL_HYGIENE: INDEPENDENT
HYGIENE/GROOMING: INDEPENDENT

## 2019-05-05 ASSESSMENT — MIFFLIN-ST. JEOR: SCORE: 1061.28

## 2019-05-05 NOTE — PLAN OF CARE
"Maryan has spent the majority of the shift in her room. She reports auditory hallucinations, talks a lot about them being \"gangster voices and involved in sex trafficking.\" States \"Her voices like it when she is in the hospital.\"  Maryan  continues to voice concerns that her \"Mother passed away today.\" Stating \"I just feel it in my body.\"  She has called the assisted Living facility her Mom lives at but hasn't heard anything, no return phone call. She reports eating well, sleeping okay, she complains of  feeling uneasy around some of her peers. She denies SI/SIB. Will continue to monitor and assess. Provide for safety.  "

## 2019-05-05 NOTE — PROGRESS NOTES
"Pt denies SI & SIB, but does endorse auditory, visual and tactile hallucinations. Pt explained that she took a nap after community meeting and was woken up after about an hour with \"vaginal sensations, as if someone was having sex with her\". Pt states that this is not new and happens very frequently. Pt also states that she is hearing voices and when she closes her eyes at night, she sees \"a man and a woman\". When asked if this was new, pt states that \"these are the same that I have been seeing since 1983\". Pt denies any pain. Pt reports some slight dry mouth in association with medications. Pt wants to talk to  about next steps. She states that she is interested in going to [facility] if possible. Pt was in milieu during mealtimes & during movie this shift, otherwise was isolative to room, sleeping. Pt did not report any additional complaints or concerns during check in.        05/05/19 1200   Behavioral Health   Hallucinations auditory;tactile;visual   Thinking distractable;delusional;poor concentration   Orientation person: oriented;place: oriented;time: oriented   Memory confabulation   Insight poor   Judgement impaired   Eye Contact at examiner   Affect blunted, flat   Mood mood is calm   Suicidality other (see comments)  (pt denies)   1. Wish to be Dead No   2. Non-Specific Active Suicidal Thoughts  No   Self Injury other (see comment)  (pt denies)   Elopement   (no observed behaviors)   Activity isolative;withdrawn  (was in milieu during movies this afternoon)   Speech clear;coherent   Medication Sensitivity no observed side effects;other (see comment)  (pt endorses slight dry mouth)   Psychomotor / Gait balanced;steady   Activities of Daily Living   Hygiene/Grooming independent   Oral Hygiene independent   Dress scrubs (behavioral health)   Room Organization independent     "

## 2019-05-06 PROCEDURE — 25000132 ZZH RX MED GY IP 250 OP 250 PS 637: Performed by: PSYCHIATRY & NEUROLOGY

## 2019-05-06 PROCEDURE — 12400001 ZZH R&B MH UMMC

## 2019-05-06 PROCEDURE — H2032 ACTIVITY THERAPY, PER 15 MIN: HCPCS

## 2019-05-06 PROCEDURE — 99232 SBSQ HOSP IP/OBS MODERATE 35: CPT | Performed by: PSYCHIATRY & NEUROLOGY

## 2019-05-06 PROCEDURE — G0177 OPPS/PHP; TRAIN & EDUC SERV: HCPCS

## 2019-05-06 RX ADMIN — NICOTINE 1 PATCH: 21 PATCH, EXTENDED RELEASE TRANSDERMAL at 08:19

## 2019-05-06 RX ADMIN — HYDROXYZINE HYDROCHLORIDE 25 MG: 25 TABLET ORAL at 08:26

## 2019-05-06 RX ADMIN — OLANZAPINE 5 MG: 5 TABLET, FILM COATED ORAL at 08:19

## 2019-05-06 RX ADMIN — OLANZAPINE 5 MG: 5 TABLET, FILM COATED ORAL at 15:29

## 2019-05-06 RX ADMIN — BENZOCAINE, MENTHOL 1 LOZENGE: 15; 3.6 LOZENGE ORAL at 20:54

## 2019-05-06 RX ADMIN — TRAZODONE HYDROCHLORIDE 50 MG: 50 TABLET ORAL at 20:54

## 2019-05-06 RX ADMIN — OLANZAPINE 10 MG: 10 TABLET, FILM COATED ORAL at 20:54

## 2019-05-06 RX ADMIN — HYDROXYZINE HYDROCHLORIDE 25 MG: 25 TABLET ORAL at 15:29

## 2019-05-06 RX ADMIN — MULTIPLE VITAMINS W/ MINERALS TAB 1 TABLET: TAB at 08:19

## 2019-05-06 ASSESSMENT — ACTIVITIES OF DAILY LIVING (ADL)
ORAL_HYGIENE: INDEPENDENT
HYGIENE/GROOMING: INDEPENDENT
LAUNDRY: WITH SUPERVISION
DRESS: SCRUBS (BEHAVIORAL HEALTH)
DRESS: INDEPENDENT
HYGIENE/GROOMING: INDEPENDENT
ORAL_HYGIENE: INDEPENDENT

## 2019-05-06 NOTE — PLAN OF CARE
Patient attended OT health and coping relaxation group with emphasis on mindfulness, emotional regulation, and awareness. She was engaged in discussion and initiated contribution. She followed exercise and reported positive experience. She attended OT clinic and worked with focus and curiosity on a new project. She was open to exploring options and creating a pattern pleasing to herself. SHe expressed uncertainty at different points, asking writer for opinion, but with light reassurance she continued on. SHe required prompt to leave zippered pouch in cabinet until finished when it would go in her locker.

## 2019-05-06 NOTE — PROGRESS NOTES
"    Pt requested to see me and we met.    Pt demonstrated paranoia (I'm being stalked and pursued.)  but was less vocal about it than immediately upon admission.    Pt is more organized in thought and behaviors. She is able to manage her personal affairs.    We called the facility to talk to her mother and they said she is not there. We googled her mother and found her obituary. She  mid April and was buried later April. Pt became tearful. We looked at the obituary online and the pictures and condolence notes. I printed this off for her. She still refused to call her siblings. She say her sisters picture on the obituary web site and referred to her as \"dangerous to me.\"    Pt was insistent that she wanted to stay in [place].    We made a referral for mental health case management through the [facility]. They are accepting this referral. I need to  send the Diagnostic Assessment. Assignment to a  will take 2 weeks.  Ute Stearns  Ph: [phone]  Fax: [phone]  [email or web address].  I faxed the DA.      Pt has the information about [facility] shelter.  I showed her on the map where Jose was from [place] and explained that there are more services here but [facility] I believe. Pt said you have to weight the pros and cons. I printed her off the information about [facility] which helps connection with all services as needed. Pt said that a nurse named Larissa told her about an IRTS. I explained what this was and pt said she did not want this. I explained that I understood that she wanted housing not treatment. I explained that she does not have the right insurance anyway.    Pt said that she gave the intake people 4 insurance cards. One was for [employer] - a medical assistance card from [place]. We tried to call there @ [phone] to cancel this but were on hold too long. Pt will try by herself.    Pt seems to be able to qualify for MA due to her " low income. I have left a vm with the Financial Counseling Office requesting their assitance in helping her to apply for [facility].     I gave pt the [employer] [place] pdf printout and the the [facility] for housing information.  I gave pt the phone # to [facility] in [facility] where she has been living and she is going to call and see if she can get her mail forwarded here which would be her birth certificate.

## 2019-05-06 NOTE — PLAN OF CARE
"Maryan has been withdrawn and isolative to her room the majority of the shift. She has come out for supper and snacks. Maryan reports that the voices have decreased in volume but remain there. She continues to worry about her Mother's health status and believes her Mother to be . Maryan plans on calling the assisted Living facility where her mother lives in the am. Maryan stated \"I'm not allowed to call my siblings.\" She has been writing lots of notes to herself and did not offer to share them with me, I did note lots of numbers on them. She is thankful for the care she is getting here. She still hopes to go to [facility] since another patient informed her that \"They would help her find a job\" Her affect remains flat, she remains tense. She denies SI/SIB.   "

## 2019-05-06 NOTE — PROGRESS NOTES
05/06/19 1224   Behavioral Health   Hallucinations auditory;tactile   Thinking delusional   Orientation person: oriented;place: oriented   Memory baseline memory   Insight poor   Judgement impaired   Eye Contact at examiner   Affect full range affect   Mood mood is calm   Physical Appearance/Attire appears stated age   Hygiene neglected grooming - unclean body, hair, teeth   Suicidality other (see comments)  (denies)   1. Wish to be Dead No   2. Non-Specific Active Suicidal Thoughts  No   Activities of Daily Living   Hygiene/Grooming independent   Oral Hygiene independent   Dress scrubs (behavioral health)   Laundry with supervision   Room Organization independent   Pt went to groups. Pt denies SI and SIB. Pt was calm and cooperative.

## 2019-05-07 PROCEDURE — 25000132 ZZH RX MED GY IP 250 OP 250 PS 637: Performed by: PSYCHIATRY & NEUROLOGY

## 2019-05-07 PROCEDURE — G0177 OPPS/PHP; TRAIN & EDUC SERV: HCPCS

## 2019-05-07 PROCEDURE — 12400001 ZZH R&B MH UMMC

## 2019-05-07 PROCEDURE — 99232 SBSQ HOSP IP/OBS MODERATE 35: CPT | Performed by: PSYCHIATRY & NEUROLOGY

## 2019-05-07 RX ORDER — TRIFLUOPERAZINE HYDROCHLORIDE 2 MG/1
4 TABLET, FILM COATED ORAL 3 TIMES DAILY PRN
Status: DISCONTINUED | OUTPATIENT
Start: 2019-05-07 | End: 2019-05-10 | Stop reason: HOSPADM

## 2019-05-07 RX ADMIN — NICOTINE 1 PATCH: 21 PATCH, EXTENDED RELEASE TRANSDERMAL at 08:34

## 2019-05-07 RX ADMIN — MULTIPLE VITAMINS W/ MINERALS TAB 1 TABLET: TAB at 08:33

## 2019-05-07 RX ADMIN — OLANZAPINE 10 MG: 10 TABLET, FILM COATED ORAL at 21:24

## 2019-05-07 RX ADMIN — TRIFLUOPERAZINE HYDROCHLORIDE 4 MG: 2 TABLET, FILM COATED ORAL at 14:03

## 2019-05-07 RX ADMIN — OLANZAPINE 5 MG: 5 TABLET, FILM COATED ORAL at 13:06

## 2019-05-07 RX ADMIN — TRIFLUOPERAZINE HYDROCHLORIDE 4 MG: 2 TABLET, FILM COATED ORAL at 22:33

## 2019-05-07 RX ADMIN — BENZOCAINE, MENTHOL 1 LOZENGE: 15; 3.6 LOZENGE ORAL at 21:26

## 2019-05-07 RX ADMIN — TRAZODONE HYDROCHLORIDE 50 MG: 50 TABLET ORAL at 21:24

## 2019-05-07 RX ADMIN — OLANZAPINE 5 MG: 5 TABLET, FILM COATED ORAL at 08:33

## 2019-05-07 ASSESSMENT — ACTIVITIES OF DAILY LIVING (ADL)
HYGIENE/GROOMING: INDEPENDENT
ORAL_HYGIENE: INDEPENDENT
DRESS: SCRUBS (BEHAVIORAL HEALTH)

## 2019-05-07 ASSESSMENT — MIFFLIN-ST. JEOR: SCORE: 1070.35

## 2019-05-07 NOTE — PROGRESS NOTES
05/06/19 1900   Art Therapy   Type of Intervention structured groups   Response participates with encouragement   Hours 1   Treatment Detail   (DBT House)   Problem-Schizophrenia    Goal- express, cope and regulate through art therapy/ dbt directive    Outcome- pt came into group a bit late. She was quietly engaged in the DBT house exercise. When sharing she quoted the bible and talked about her spiritual relationship with God and Faisal becoming stronger and stronger and that she couldn't do this without her darin.

## 2019-05-07 NOTE — PLAN OF CARE
Patient attended OT clinic. She was engaged in multiple tasks, attentive to each. She enjoys focused, creative pursuits and demonstrated some independent problem solving with additional suggestions from writer. She sought validation of her work and responded positively. She cleaned up materials and required cues for memory to put items away.

## 2019-05-07 NOTE — PROGRESS NOTES
Pt tried to cancel her [place] [employer] via phone this morning and could not get through again. We wrote a letter and faxed it to [facility] requesting cancellation of medicaid and SNAP so pt can apply for medical assistance her.  I spoke with Financial Counseling and they are looking into this.    [id number]  Fax: [phone]  E-mail: [email or web address]      Pt is spending the morning pouring over the Handbook of the [employer].  She did not go to the Pharmacy group but was doing this.    Pt was referred to mental health case management.    Pt is getting ready for discharge.    Pt had an outburst at the desk railing about staff people that are .

## 2019-05-07 NOTE — PROGRESS NOTES
Pt denies SI/SIB. Pt was isolative to room - pt did come out for dinner. Pt affect was flat. Pt reports vocies are always there.      05/06/19 9035   Behavioral Health   Hallucinations auditory   Thinking distractable;poor concentration   Orientation person: oriented;place: oriented   Memory baseline memory   Insight poor   Judgement impaired   Eye Contact at examiner   Affect blunted, flat   Mood mood is calm   Suicidality other (see comments)  (pt denies)   Self Injury other (see comment)  (pt denies)   Activity isolative   Activities of Daily Living   Hygiene/Grooming independent   Oral Hygiene independent   Dress independent   Room Organization independent

## 2019-05-07 NOTE — PLAN OF CARE
"48 hour nursing assessment:  Patient began the shift requesting to take her medications and having phone calls. Patient was pleasant upon approach and appeared organized. Patient appeared to be getting frustrated with her phone calls. After meeting with the doctor patient requested the PRN Trifluoperazine that the doctor had just ordered. However, the medication had not come up from the pharmacy at the time. The patient took her scheduled dose of Zyprexa and went to lay down. This writer went into the patient's room to explain that we were still waiting on the medication and to check in with the patient. The patient appeared irritable, but was willing to answer questions about her mood. Soon after this writer left the patient's room she came to the desk to express her frustration about being awakened by this writer and asked why this writer was asking her questions. One of the psych associates explained that those questions are asked of every patient. The patient then went on to say that she didn't want this writer or another Black staff to bully her and she asked for a new nurse. Once her PRN came from the pharmacy the patient was given the dose which she reluctantly took from this writer after saying \"show everybody what you do\".     The patient endorsed high levels of depression and anxiety. The patient was frustrated because she was \"tired of waiting\". The patient denied SI and SIB. She became guarded when asked questions about hallucinations, but she did deny them. The patient's agitation was sudden, but after taking the medication she went back to her room. Will continue to monitor.   "

## 2019-05-07 NOTE — PROGRESS NOTES
"[facility], [place]   Psychiatric Progress Note        Interim History:   The patient's care was discussed with the treatment team during the daily team meeting and/or staff's chart notes were reviewed.  Staff report: No acute issues    The patient was in OT group, sitting at a table by herself, painting a picture.  She reports increased hallucinations today and seem to be bothered by their presence.    Tolerating medications fairly however blood pressure continues to be slightly low.  She did not report any lightheadedness or dizziness.    Patient denies SI/HI.  Sleeping well.  Eating adequately.         Medications:       multivitamin w/minerals  1 tablet Oral Daily     nicotine  1 patch Transdermal Daily     nicotine   Transdermal Q8H     nicotine   Transdermal Daily     OLANZapine  10 mg Oral At Bedtime     OLANZapine  5 mg Oral BID          Allergies:   No Known Allergies       Labs:   No results found for this or any previous visit (from the past 24 hour(s)).       Psychiatric Examination:     BP 92/63   Pulse 75   Temp 97.4  F (36.3  C) (Tympanic)   Resp 16   Ht 1.626 m (5' 4\")   Wt 48.5 kg (107 lb)   SpO2 96%   BMI 18.37 kg/m    Weight is 107 lbs 0 oz  Body mass index is 18.37 kg/m .  Orthostatic Vitals       Most Recent      Sitting Orthostatic BP 89/60 05/07 0700    Sitting Orthostatic Pulse (bpm) 73 05/07 0700    Standing Orthostatic BP 96/62 05/07 0700    Standing Orthostatic Pulse (bpm) 92 05/07 0700            Appearance: awake, alert  Attitude:  cooperative  Eye Contact:  better  Mood:  better  Affect:  appropriate and in normal range  Speech:  clear, coherent  Psychomotor Behavior:  no evidence of tardive dyskinesia, dystonia, or tics  Throught Process:  linear  Associations:  no loose associations  Thought Content:  no evidence of suicidal ideation or homicidal ideation and auditory hallucinations present  Insight:  partial  Judgement:  intact  Oriented to:  time, " person, and place  Attention Span and Concentration:  intact  Recent and Remote Memory:  intact    Clinical Global Impressions  First:  Considering your total clinical experience with this particular patient population, how severe are the patient's symptoms at this time?: 4 (05/02/19 1239)  Compared to the patient's condition at the START of treatment, this patient's condition is:: 2 (05/02/19 1239)  Most recent:  Considering your total clinical experience with this particular patient population, how severe are the patient's symptoms at this time?: 4 (05/02/19 1239)  Compared to the patient's condition at the START of treatment, this patient's condition is:: 2 (05/02/19 1239)         Precautions:     Behavioral Orders   Procedures     Code 1 - Restrict to Unit     Routine Programming     As clinically indicated     Single Room     Status 15     Every 15 minutes.          DIagnoses:   Schizophrenia       Plan:     Monitoring response to Zyprexa.  Some improvements noted.   Add Stelazine as needed to augment Zyprexa and reducing psychosis.  Risks and benefits were reviewed with the patient.    Psychosocial treatments to be addressed with social work consult and groups.    Legal status: Voluntary    Disposition: To be determined; the patient is currently homeless and has out of state insurance.  Exploring options for shelters.

## 2019-05-07 NOTE — PROGRESS NOTES
"[facility], [place]   Psychiatric Progress Note        Interim History:   The patient's care was discussed with the treatment team during the daily team meeting and/or staff's chart notes were reviewed.  Staff report: No acute issues    Patient reports gaining benefit since our last meeting.  Auditory hallucinations have reduced in intensity however continue to occur to a moderate degree.  Paranoid delusions continue however the intensity has also decreased.    Tolerating medications fairly however blood pressure continues to be slightly low.  She did not report any lightheadedness or dizziness.    Patient denies SI/HI.  Sleeping well.  Eating adequately.         Medications:       multivitamin w/minerals  1 tablet Oral Daily     nicotine  1 patch Transdermal Daily     nicotine   Transdermal Q8H     nicotine   Transdermal Daily     OLANZapine  10 mg Oral At Bedtime     OLANZapine  5 mg Oral BID          Allergies:   No Known Allergies       Labs:   No results found for this or any previous visit (from the past 24 hour(s)).       Psychiatric Examination:     BP 92/63   Pulse 75   Temp 97.4  F (36.3  C) (Tympanic)   Resp 16   Ht 1.626 m (5' 4\")   Wt 48.5 kg (107 lb)   SpO2 96%   BMI 18.37 kg/m    Weight is 107 lbs 0 oz  Body mass index is 18.37 kg/m .  Orthostatic Vitals       Most Recent      Sitting Orthostatic BP 89/60 05/07 0700    Sitting Orthostatic Pulse (bpm) 73 05/07 0700    Standing Orthostatic BP 96/62 05/07 0700    Standing Orthostatic Pulse (bpm) 92 05/07 0700            Appearance: awake, alert  Attitude:  cooperative  Eye Contact:  better  Mood:  better  Affect:  appropriate and in normal range  Speech:  clear, coherent  Psychomotor Behavior:  no evidence of tardive dyskinesia, dystonia, or tics  Throught Process:  linear  Associations:  no loose associations  Thought Content:  no evidence of suicidal ideation or homicidal ideation and auditory hallucinations " present  Insight:  partial  Judgement:  intact  Oriented to:  time, person, and place  Attention Span and Concentration:  intact  Recent and Remote Memory:  intact    Clinical Global Impressions  First:  Considering your total clinical experience with this particular patient population, how severe are the patient's symptoms at this time?: 4 (05/02/19 1239)  Compared to the patient's condition at the START of treatment, this patient's condition is:: 2 (05/02/19 1239)  Most recent:  Considering your total clinical experience with this particular patient population, how severe are the patient's symptoms at this time?: 4 (05/02/19 1239)  Compared to the patient's condition at the START of treatment, this patient's condition is:: 2 (05/02/19 1239)         Precautions:     Behavioral Orders   Procedures     Code 1 - Restrict to Unit     Routine Programming     As clinically indicated     Single Room     Status 15     Every 15 minutes.          DIagnoses:   Schizophrenia       Plan:     Monitoring response to Zyprexa.  Some improvements noted.  No medication changes today.    Psychosocial treatments to be addressed with social work consult and groups.    Legal status: Voluntary    Disposition: To be determined; the patient is currently homeless and has out of state insurance.  Exploring options for shelters.

## 2019-05-07 NOTE — PROGRESS NOTES
CLINICAL NUTRITION SERVICES - REASSESSMENT NOTE     Nutrition Prescription    RECOMMENDATIONS FOR MDs/PROVIDERS TO ORDER:  Recommend MD write outpatient prescription for Boost/Ensure to help pt maintain/gain weight outpatient. Pt really hoping for a prescription as she enjoys these drinks and wants to continue gaining wt.    Malnutrition Status:    Non-severe malnutrition in the context of acute on chronic illness (socio-ecomonic factors)    Recommendations already ordered by Registered Dietitian (RD):  Continue Boost as ordered.       EVALUATION OF THE PROGRESS TOWARD GOALS   Diet: Regular + Boost Plus Strawberry with meals    Intake: Pt noted to be eating meals and snack per chart review.  Pt reports eating all meals and drinking Boosts. She is pleased with her wt gain.     NEW FINDINGS   -General: SW met with pt on 5/6 and provided/discussed resources such as [facility]. This should help with some of her food insecurity.    -Wt trend: Wt has steadily increased since admission (46.3 kg on 4/29-->48.5 kg on 5/7). BMI is approaching healthy range (now 18.35 kg/m2).      MALNUTRITION  % Intake: No decreased intake noted  % Weight Loss: None noted  Subcutaneous Fat Loss: Facial region, Upper arm, Lower arm and Thoracic/intercostal: Moderate  Muscle Loss: Generalized mild-moderate  Fluid Accumulation/Edema: None noted  Malnutrition Diagnosis: Non-severe malnutrition in the context of acute on chronic illness (socio-ecomonic factors)    Previous Goals   Patient to consume % of nutritionally adequate meal trays TID, or the equivalent with supplements/snacks.  Evaluation: Met    Previous Nutrition Diagnosis  Underweight related to food insecurity in setting of homelessness as evidenced by pt report of trouble maintaining weight, still feeling hungry after meals served in shelters, and BMI of 17.51 kg/m2.  Evaluation: Improving    CURRENT NUTRITION DIAGNOSIS  Underweight related to food insecurity in  "setting of homelessness as evidenced by BMI improving with food security while inpatient (BMI now 18.37 kg/m2), but still in underweight range.       INTERVENTIONS  Implementation  -Medical food supplement therapy: Continue Boost TID per pt preference  -Nutrition education: Encouraged pt to ask SW if Boost/Ensure Rx would be covered once her insurance situation is verified. Pt is still in process of switching insurances. Discussed another ~5-10 lbs would put her well-within the \"healthy BMI\" range. Pt would like to get back to ~130 lbs, which would also be a healthy wt.  -Collaboration with other providers: Paged psychiatry physician to consider Boost/Ensure Rx for outpatient to help pt maintain/gain wt.     Goals  Patient to consume % of nutritionally adequate meal trays TID, or the equivalent with supplements/snacks.    Monitoring/Evaluation  RD to sign off for now. If nutrition concerns arise, please place nutrition consult. Thanks!    Honey Sheets RD, LD  Pager: [phone]    "

## 2019-05-08 PROCEDURE — H2032 ACTIVITY THERAPY, PER 15 MIN: HCPCS

## 2019-05-08 PROCEDURE — 12400001 ZZH R&B MH UMMC

## 2019-05-08 PROCEDURE — G0177 OPPS/PHP; TRAIN & EDUC SERV: HCPCS

## 2019-05-08 PROCEDURE — 25000132 ZZH RX MED GY IP 250 OP 250 PS 637: Performed by: PSYCHIATRY & NEUROLOGY

## 2019-05-08 PROCEDURE — 99232 SBSQ HOSP IP/OBS MODERATE 35: CPT | Performed by: PSYCHIATRY & NEUROLOGY

## 2019-05-08 RX ADMIN — TRIFLUOPERAZINE HYDROCHLORIDE 4 MG: 2 TABLET, FILM COATED ORAL at 12:39

## 2019-05-08 RX ADMIN — OLANZAPINE 5 MG: 5 TABLET, FILM COATED ORAL at 14:20

## 2019-05-08 RX ADMIN — TRIFLUOPERAZINE HYDROCHLORIDE 4 MG: 2 TABLET, FILM COATED ORAL at 21:25

## 2019-05-08 RX ADMIN — MULTIPLE VITAMINS W/ MINERALS TAB 1 TABLET: TAB at 08:05

## 2019-05-08 RX ADMIN — BENZOCAINE, MENTHOL 1 LOZENGE: 15; 3.6 LOZENGE ORAL at 14:20

## 2019-05-08 RX ADMIN — NICOTINE 1 PATCH: 21 PATCH, EXTENDED RELEASE TRANSDERMAL at 08:05

## 2019-05-08 RX ADMIN — OLANZAPINE 10 MG: 10 TABLET, FILM COATED ORAL at 20:44

## 2019-05-08 RX ADMIN — OLANZAPINE 5 MG: 5 TABLET, FILM COATED ORAL at 08:05

## 2019-05-08 ASSESSMENT — ACTIVITIES OF DAILY LIVING (ADL)
ORAL_HYGIENE: INDEPENDENT
HYGIENE/GROOMING: INDEPENDENT
DRESS: SCRUBS (BEHAVIORAL HEALTH)
ORAL_HYGIENE: INDEPENDENT
LAUNDRY: WITH SUPERVISION
LAUNDRY: WITH SUPERVISION
DRESS: SCRUBS (BEHAVIORAL HEALTH)
HYGIENE/GROOMING: INDEPENDENT

## 2019-05-08 NOTE — PLAN OF CARE
"Maryan reports having an \"Okay Day\". She currently is inquiring about having possible ECT treatments stated \"Another patient here had ECT treatments and it helped them.\" she reports that \"I'm still hearing voices more in the cafeteria than in my room.\"  \"I don't think they'll ever go all the way away.\"  She reports enjoying the evening art group although she said she was \"Tired.\" Denies any Suicidal thoughts at this time. Denies SIB. Complains of auditory hallucinations. Stated to this writer \"I found out my Mother passed away, I knew she had passed. I don't have any guilt about it.\" Will continue to monitor and assess patient.  "

## 2019-05-08 NOTE — PROGRESS NOTES
"[facility], [place]   Psychiatric Progress Note        Interim History:   The patient's care was discussed with the treatment team during the daily team meeting and/or staff's chart notes were reviewed.  Staff report: Episode of agitation yesterday where the patient made several racial comments towards her nurse.    Auditory hallucinations are slightly less intense today although continue to occur at a moderate intensity which causes her significant distress.  The patient recalls that her mood was more labile yesterday and is regretful of the events which had occurred between she and her nurse.  She finds Stelazine to be beneficial.  No medication side effects reported.    Patient denies SI/HI.  Sleeping well.  Eating adequately.         Medications:       multivitamin w/minerals  1 tablet Oral Daily     nicotine  1 patch Transdermal Daily     nicotine   Transdermal Q8H     nicotine   Transdermal Daily     OLANZapine  10 mg Oral At Bedtime     OLANZapine  5 mg Oral BID          Allergies:   No Known Allergies       Labs:   No results found for this or any previous visit (from the past 24 hour(s)).       Psychiatric Examination:     BP 95/57   Pulse 76   Temp 95.6  F (35.3  C) (Tympanic)   Resp 16   Ht 1.626 m (5' 4\")   Wt 48.5 kg (107 lb)   SpO2 96%   BMI 18.37 kg/m    Weight is 107 lbs 0 oz  Body mass index is 18.37 kg/m .  Orthostatic Vitals       Most Recent      Sitting Orthostatic BP 89/60 05/07 0700    Sitting Orthostatic Pulse (bpm) 73 05/07 0700    Standing Orthostatic BP 96/62 05/07 0700    Standing Orthostatic Pulse (bpm) 92 05/07 0700            Appearance: awake, alert  Attitude:  cooperative  Eye Contact:  better  Mood:  better  Affect:  appropriate and in normal range  Speech:  clear, coherent  Psychomotor Behavior:  no evidence of tardive dyskinesia, dystonia, or tics  Throught Process:  linear  Associations:  no loose associations  Thought Content:  no evidence of " suicidal ideation or homicidal ideation and auditory hallucinations present  Insight:  partial  Judgement:  intact  Oriented to:  time, person, and place  Attention Span and Concentration:  intact  Recent and Remote Memory:  intact    Clinical Global Impressions  First:  Considering your total clinical experience with this particular patient population, how severe are the patient's symptoms at this time?: 4 (05/02/19 1239)  Compared to the patient's condition at the START of treatment, this patient's condition is:: 2 (05/02/19 1239)  Most recent:  Considering your total clinical experience with this particular patient population, how severe are the patient's symptoms at this time?: 4 (05/02/19 1239)  Compared to the patient's condition at the START of treatment, this patient's condition is:: 2 (05/02/19 1239)         Precautions:     Behavioral Orders   Procedures     Code 1 - Restrict to Unit     Routine Programming     As clinically indicated     Single Room     Status 15     Every 15 minutes.          DIagnoses:   Schizophrenia       Plan:     Monitoring response to Zyprexa.  Some improvements noted.   Continue Stelazine as needed to augment Zyprexa and reducing psychosis.      Psychosocial treatments to be addressed with social work consult and groups.    Legal status: Voluntary    Disposition: To be determined; the patient is currently homeless and has out of state insurance.  Exploring options for shelters.

## 2019-05-08 NOTE — PROGRESS NOTES
"Pt spent most of the shift in her room, she came out for dinner and attended AA meeting. Pt states she believes the new medication is helping however she continues to have AH. Pt stated, \" I just want the voices to stop\". Pt did not engage with staff or peers and states she is feeling hopeful tonight.  Denies SI SIB.      05/07/19 2052   Behavioral Health   Hallucinations auditory   Thinking poor concentration   Orientation place: oriented;date: oriented   Memory new learning, recall loss   Insight admits / accepts   Judgement impaired   Eye Contact at examiner;staring   Affect blunted, flat   Mood mood is calm   Physical Appearance/Attire attire appropriate to age and situation   Suicidality other (see comments)  (denies)   1. Wish to be Dead No   2. Non-Specific Active Suicidal Thoughts  No     "

## 2019-05-08 NOTE — PROGRESS NOTES
Pt was visible in the milieu all shift attending and participating in scheduled groups. Pt was calm, polite and cooperative , pleasant on approach . Pt goal was to talk to her CTC  about getting her insurance set up. Pt requested the number off her insurance card  and made some phone calls this shift. Pt reported endorsing auditory hallucination whenever she's alone or  in her room. Pt denies SI/SIB thoughts, expressed her new medications are helping with her anxiety and the voices. Pt ate all meals, not further concerns.        05/08/19 1502   Behavioral Health   Hallucinations auditory   Thinking intact   Orientation time: oriented;date: oriented;place: oriented;person: oriented   Memory baseline memory   Insight insight appropriate to situation   Judgement impaired   Eye Contact at examiner   Affect full range affect   Mood mood is calm   Physical Appearance/Attire attire appropriate to age and situation   Hygiene well groomed   Suicidality other (see comments)  (Pt denies)   1. Wish to be Dead No   Wish to be Dead Description   (none stated or observed)   2. Non-Specific Active Suicidal Thoughts  No   Non-Specific Active Suicidal Thought Description   (none stated or observed.)   Self Injury other (see comment)  (Pt denies)   Activity other (see comment)  (Visible attending and participating in scheduled groups.)   Speech clear;coherent   Medication Sensitivity no stated side effects;no observed side effects   Psychomotor / Gait balanced;steady   Psycho Education   Type of Intervention 1:1 intervention   Response participates, initiates socially appropriate   Hours 0.5   Treatment Detail   (1:1 check in )   Activities of Daily Living   Hygiene/Grooming independent   Oral Hygiene independent   Dress scrubs (behavioral health)   Laundry with supervision   Room Organization independent

## 2019-05-08 NOTE — PLAN OF CARE
BEHAVIORAL TEAM DISCUSSION    Participants:   Dr. Arnett, Suzie DOWNING, Reid Houston RN      Progress:  Pt's symptoms have been improving for most of the time she has been here. However, there has been some decompensation over the last couple of days. Yesterday pt had an aggressive verbal outburst directed toward an  RN and made some derogatory remarks about an  psych associate.  Pt has tactile hallucinations regarding her anal area and requests wipes to cleanse herself.  Pt wants housing and not residential treatment.    Continued Stay Criteria/Rationale:   The pt will be discharged when she is more organized and able to mange herself in the community.    Medical/Physical:  Lower weight  Broken glasses  Ill fitting dentures      Precautions:   Behavioral Orders   Procedures     Code 1 - Restrict to Unit     Routine Programming     As clinically indicated     Single Room     Status 15     Every 15 minutes.     Plan:   Medication adjustment  Continue to offer group programming  Offer assistance with concrete resources          Rationale for change in precautions or plan:   No changes

## 2019-05-08 NOTE — PROGRESS NOTES
Pt attended the group on IMR#10: Getting Your Needs Met in the Mental Health System. Pt was very attentive to the material and is motivated to understand the resources in [facility] as she wants to reside here.            05/08/19 1526   Psycho Education   Type of Intervention structured groups   Response participates, initiates socially appropriate   Hours 1   Treatment Detail IMR#10: Getting Your Needs Met in the Mental Health System

## 2019-05-08 NOTE — PROGRESS NOTES
Behavioral Health  Note   Behavioral Health  Spirituality Group Note     Unit 30    Name: Maryan Goldstein    YOB: 1965   MRN: [id number]    Age: 53 year old     Patient attended -led group, which included discussion of spirituality, coping with illness and building resilience.   Patient attended group for 1.0 hrs.   patient minimally participated, but was respectful to the group process.     [facility]  Staff    Page [phone]
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
## 2019-05-09 PROCEDURE — 99239 HOSP IP/OBS DSCHRG MGMT >30: CPT | Performed by: PSYCHIATRY & NEUROLOGY

## 2019-05-09 PROCEDURE — 25000132 ZZH RX MED GY IP 250 OP 250 PS 637: Performed by: PSYCHIATRY & NEUROLOGY

## 2019-05-09 PROCEDURE — G0177 OPPS/PHP; TRAIN & EDUC SERV: HCPCS

## 2019-05-09 PROCEDURE — H2032 ACTIVITY THERAPY, PER 15 MIN: HCPCS

## 2019-05-09 PROCEDURE — 12400001 ZZH R&B MH UMMC

## 2019-05-09 RX ORDER — OLANZAPINE 15 MG/1
15 TABLET ORAL AT BEDTIME
Qty: 30 TABLET | Refills: 0 | Status: SHIPPED | OUTPATIENT
Start: 2019-05-09

## 2019-05-09 RX ORDER — TRIFLUOPERAZINE HYDROCHLORIDE 2 MG/1
4 TABLET, FILM COATED ORAL 3 TIMES DAILY PRN
Qty: 60 TABLET | Refills: 0 | Status: SHIPPED | OUTPATIENT
Start: 2019-05-09

## 2019-05-09 RX ADMIN — OLANZAPINE 5 MG: 5 TABLET, FILM COATED ORAL at 14:19

## 2019-05-09 RX ADMIN — OLANZAPINE 10 MG: 10 TABLET, FILM COATED ORAL at 19:20

## 2019-05-09 RX ADMIN — TRAZODONE HYDROCHLORIDE 50 MG: 50 TABLET ORAL at 23:01

## 2019-05-09 RX ADMIN — BENZOCAINE, MENTHOL 1 LOZENGE: 15; 3.6 LOZENGE ORAL at 23:01

## 2019-05-09 RX ADMIN — MULTIPLE VITAMINS W/ MINERALS TAB 1 TABLET: TAB at 08:03

## 2019-05-09 RX ADMIN — TRIFLUOPERAZINE HYDROCHLORIDE 4 MG: 2 TABLET, FILM COATED ORAL at 16:09

## 2019-05-09 RX ADMIN — NICOTINE 1 PATCH: 21 PATCH, EXTENDED RELEASE TRANSDERMAL at 08:03

## 2019-05-09 RX ADMIN — OLANZAPINE 5 MG: 5 TABLET, FILM COATED ORAL at 08:03

## 2019-05-09 ASSESSMENT — ACTIVITIES OF DAILY LIVING (ADL)
LAUNDRY: UNABLE TO COMPLETE
LAUNDRY: UNABLE TO COMPLETE
HYGIENE/GROOMING: INDEPENDENT
DRESS: STREET CLOTHES
DRESS: STREET CLOTHES
HYGIENE/GROOMING: INDEPENDENT
ORAL_HYGIENE: INDEPENDENT
ORAL_HYGIENE: INDEPENDENT

## 2019-05-09 ASSESSMENT — MIFFLIN-ST. JEOR: SCORE: 1088.49

## 2019-05-09 NOTE — DISCHARGE SUMMARY
[facility]  Department of Psychiatry    DATE OF ADMISSION:  4/29/2019    DATE OF DISCHARGE:  5/10/2019    DISCHARGE DIAGNOSES:   Schizophrenia    HOSPITAL COURSE: (Refer to H&P, progress notes, and consult notes for details)    The patient was admitted to our Behavioral Health Unit under voluntary status for severe psychotic symptoms causing significant emotional distress and limiting her ability to provide herself with adequate care.  Hospitalization was continued voluntarily.  Zyprexa was started for treatment of psychosis.  As she was awaiting for therapeutic response, she found benefit from Stelazine on an as-needed basis for further reduction of psychosis.  Psychotic symptoms significantly reduced and her mood subsequently improved.  She was able to better engage in self-care needs.  She was sleeping well and eating adequately.  She work with her  on optimizing in outpatient care plan.  She was not interested in pursuing residential treatment.  Noting the improvements she had gained, she requested to be discharged from the hospital.  Since she did not meet criteria to be placed under an involuntary hold, she was discharged from the hospital per her request and her care was transitioned to outpatient providers.    Other interventions received during his hospitalization included:   Psychosocial treatments were addressed with groups, social work consult, and supportive milieu provided by staff.    CONDITION AT DISCHARGE:  Improved.  The patients acute suicide risk is low due to the following factors:  improved mood/anxiety symptoms.  Denies suicidal ideations. Denies psychotic symptoms.  Not actively intoxicated and plans to abstain from illicit substances and alcohol.  Denies access to guns.  Denies feeling hopeless or helpless. At the time of discharge Maryan Goldstein was determined to not be an immediate danger to herself or others. The patient's acute risk will be  higher if noncompliant with treatment plan, medications, follow-up or using illicit substances or alcohol.  These findings along with the risks of noncompliance with medications and treatment plan, which could potentially cause decompensation and increase the risk for suicide, were discussed with the patient.  The patients chronic suicide risk is moderate given the following factors: white race; diagnosis of Schizophrenia, unemployed; homeless;Denied a family history of suicide.  No prior suicide attempts reported.  Preventative factors include: Spiritism beliefs     MENTAL STATUS EXAMINATION AT TIME OF DISCHARGE:  The patient is 53 year old White female who appears their stated age and is appropriately dressed with good hygiene.  Calm and cooperative with the interview questions.  No psychomotor abnormalities are noted. Eye contact is appropriate. Speech has normal rate, tone, latency and volume and is not pushed or pressured. Mood is euthymic and affect is full and appropriate.  The patient does not seem overtly depressed, anxious, manic or irritable.  Thought process is linear, logical and future oriented.  Thought process is not tangential, circumstantial or disorganized.  Thought content is notable for mild paranoia however significantly improved.  She did not appear to be overtly responding to any hallucinations.  The patient denies suicidal and homicidal ideations as well as auditory and visual hallucinations.  Insight and judgment are fair.  Cognition appears intact to interviewing including orientation, recent and remote memory, fund of knowledge, use of language, attention span and concentration.  Muscle strength, tone and gait appear normal on visual inspection.      DISPOSITION: The patient was homeless at the time of discharge and planned to continue utilizing the community shelter system which she was familiar with.    FOLLOWUP APPOINTMENTS:  ( per social workers notes and after visit summary)  You  were referred for adult mental health case management on May 6, 2019. ([facility] is at [phone])  Ute Stearns is the  who is processing your request. Call her for any update.  Ph: [phone]  [email or web address]  The Health Unit Coordinator has faxed these discharge instructions to Fax: [phone]      You have been given a Handbook of the Streets and the street outreach handout from [facility].  You are homeless and are going to the Adult Shelter Connect  at [facility].  You will get a community card and an assessment and placement at a shelter. You will begin to work with housing workers.  [address] [place] [address] in [facility]  Entrance on the intersection of [address]  Monday - Friday: 9:00am-5:30pm  Weekend and holidays: 1:00-5:30pm   [phone]  Nightly after 7:30 p.m. call [phone]  or  [facility] Hotline (1-399.284.4077)    DISCHARGE MEDICATIONS:   Discharge Medication List as of 5/10/2019 11:44 AM      START taking these medications    Details   OLANZapine (ZYPREXA) 15 MG tablet Take 1 tablet (15 mg) by mouth At Bedtime, Disp-30 tablet, R-0, E-Prescribe      trifluoperazine (STELAZINE) 2 MG tablet Take 2 tablets (4 mg) by mouth 3 times daily as needed (severe anxiety or psychosis), Disp-60 tablet, R-0, E-Prescribe         STOP taking these medications       ALPRAZolam (XANAX) 0.5 MG tablet Comments:   Reason for Stopping:                LABORATORY RESULTS: (past 14 days)  No results found for this or any previous visit (from the past 336 hour(s)).    >30 minutes was spent on this discharge to allow for reviewing the patient's response to treatment, reviewing plan of care, education on medications and diagnosis, and conducting a risk assessment.

## 2019-05-09 NOTE — PLAN OF CARE
Patient participated in health and coping leisure OT group with structured group activity use to promote group cohesion, tracking, social awareness, and context appropriate conversation. Patient participated with cues for memory and sequencing from others. She shared superficially but reported enjoying the game and appeared to listen to others' responses. She tracked independently.   Participated in OT clinic and was organized. She is patient with hands on tasks and enjoys exploring options. She spent time detailing item and donated it to writer as a sample.
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
## 2019-05-09 NOTE — PROGRESS NOTES
05/08/19 1900   Art Therapy   Type of Intervention structured groups   Response participates with encouragement   Hours 1   Treatment Detail    growth   Problem-Schizophrenia     Goal- express, cope and regulate through art therapy/ dbt directive     Outcome- pt was on time and less confused then on Monday. She did a thoughtful drawing of an orange tree. She described it as child like but was happy with the results. She was soft spoken but quietly engaged in entire group hour.

## 2019-05-09 NOTE — PROGRESS NOTES
" 05/09/19 1900   Art Therapy   Type of Intervention structured groups   Response participates with encouragement   Hours 1   Treatment Detail    Growth- past, present, future   Problem-Schizophrenia     Goal- express, cope and regulate through art therapy/ dbt directive     Outcome- pt worked with the past present future concept, she used marker and stickers  She had heart and said there was love but the past was \" vivi\". She also had hearts on present section and said \" I am getting help.\"  The future she used flowers. She talked about homelessness but said she wanted to plant a garden. Writer discussed community gardening with her and she wanted to write that down and was very interested in the concept.                        "

## 2019-05-09 NOTE — PROGRESS NOTES
"[facility], [place]   Psychiatric Progress Note        Interim History:   The patient's care was discussed with the treatment team during the daily team meeting and/or staff's chart notes were reviewed.  Staff report: No acute issues.  Sleeping adequately.  Eating meals.  Taking medications.    Auditory hallucinations continue to diminish.  Paranoid delusions are reported as being minimal today.  She finds Stelazine to be beneficial.  No medication side effects reported.    Her main focus today was to discuss a plan for discharge.  She feels as though she has gained maximum benefit in the setting and would like to pursue housing options and [facility] services.  She is asking to be discharged either today or tomorrow.  She would like a prescription for Zyprexa and Stelazine to take with her as she finds these medications to be helpful.    Patient denies SI/HI.  Sleeping well.  Eating adequately.         Medications:       multivitamin w/minerals  1 tablet Oral Daily     nicotine  1 patch Transdermal Daily     nicotine   Transdermal Q8H     nicotine   Transdermal Daily     OLANZapine  10 mg Oral At Bedtime     OLANZapine  5 mg Oral BID          Allergies:   No Known Allergies       Labs:   No results found for this or any previous visit (from the past 24 hour(s)).       Psychiatric Examination:     BP (!) 89/53   Pulse 66   Temp 97.3  F (36.3  C) (Oral)   Resp 14   Ht 1.626 m (5' 4\")   Wt 50.3 kg (111 lb)   SpO2 96%   BMI 19.05 kg/m    Weight is 111 lbs 0 oz  Body mass index is 19.05 kg/m .  Orthostatic Vitals       Most Recent      Sitting Orthostatic BP 89/60 05/07 0700    Sitting Orthostatic Pulse (bpm) 73 05/07 0700    Standing Orthostatic BP 96/62 05/07 0700    Standing Orthostatic Pulse (bpm) 92 05/07 0700            Appearance: awake, alert  Attitude:  cooperative  Eye Contact:  better  Mood:  better  Affect:  appropriate and in normal range  Speech:  clear, " coherent  Psychomotor Behavior:  no evidence of tardive dyskinesia, dystonia, or tics  Throught Process:  linear  Associations:  no loose associations  Thought Content:  no evidence of suicidal ideation or homicidal ideation and auditory hallucinations present  Insight:  partial  Judgement:  intact  Oriented to:  time, person, and place  Attention Span and Concentration:  intact  Recent and Remote Memory:  intact    Clinical Global Impressions  First:  Considering your total clinical experience with this particular patient population, how severe are the patient's symptoms at this time?: 4 (05/02/19 1239)  Compared to the patient's condition at the START of treatment, this patient's condition is:: 2 (05/02/19 1239)  Most recent:  Considering your total clinical experience with this particular patient population, how severe are the patient's symptoms at this time?: 4 (05/02/19 1239)  Compared to the patient's condition at the START of treatment, this patient's condition is:: 2 (05/02/19 1239)         Precautions:     Behavioral Orders   Procedures     Code 1 - Restrict to Unit     Routine Programming     As clinically indicated     Single Room     Status 15     Every 15 minutes.          DIagnoses:   Schizophrenia       Plan:     Monitoring response to Zyprexa.  Some improvements noted.  We discussed that typical dosing is once a day and that I will be consolidating her doses to reflect that.  Continue Stelazine as needed to augment Zyprexa and reducing psychosis.      Psychosocial treatments to be addressed with social work consult and groups.    Legal status: Voluntary    Disposition: The patient is requesting discharge.  We will begin planning for discharge tomorrow morning.

## 2019-05-09 NOTE — PROGRESS NOTES
05/09/19 1400   Behavioral Health   Hallucinations denies / not responding to hallucinations   Thinking intact   Orientation person: oriented;place: oriented;date: oriented;time: oriented   Memory baseline memory   Insight insight appropriate to situation   Judgement intact   Eye Contact at examiner   Affect full range affect   Mood mood is calm   Physical Appearance/Attire attire appropriate to age and situation   Hygiene well groomed   Suicidality other (see comments)  (denies)   1. Wish to be Dead No   2. Non-Specific Active Suicidal Thoughts  No   Activities of Daily Living   Hygiene/Grooming independent   Oral Hygiene independent   Dress street clothes   Laundry unable to complete   Room Organization independent     Maryan had a good shift and reported being in a good mood. She was active and visible in the milieu, participated in groups, and was cooperative with staff and peers. She is excited to discharge and that she has gained weight while being here.

## 2019-05-09 NOTE — PROGRESS NOTES
Pt is being discharged tomorrow morning.    I left  for [employer] [facility].    We called the [facility] and made therapy and psychiatry appointments.  I had pt sign [facility] at [facility] request.  Pt signed Medicare Important message.   I offered to give pt a cab but she wants bus tokens so she can get cigarettes.  Pt understands that she will go the [facility] Connect.     AVS complete.

## 2019-05-10 VITALS
TEMPERATURE: 97.1 F | DIASTOLIC BLOOD PRESSURE: 72 MMHG | HEIGHT: 64 IN | OXYGEN SATURATION: 96 % | BODY MASS INDEX: 18.95 KG/M2 | SYSTOLIC BLOOD PRESSURE: 120 MMHG | HEART RATE: 80 BPM | RESPIRATION RATE: 14 BRPM | WEIGHT: 111 LBS

## 2019-05-10 PROCEDURE — 25000132 ZZH RX MED GY IP 250 OP 250 PS 637: Performed by: PSYCHIATRY & NEUROLOGY

## 2019-05-10 RX ADMIN — MULTIPLE VITAMINS W/ MINERALS TAB 1 TABLET: TAB at 08:18

## 2019-05-10 RX ADMIN — OLANZAPINE 5 MG: 5 TABLET, FILM COATED ORAL at 08:18

## 2019-05-10 ASSESSMENT — ACTIVITIES OF DAILY LIVING (ADL)
HYGIENE/GROOMING: HANDWASHING;INDEPENDENT;SHOWER
DRESS: STREET CLOTHES;SCRUBS (BEHAVIORAL HEALTH);INDEPENDENT
ORAL_HYGIENE: INDEPENDENT
LAUNDRY: WITH SUPERVISION
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
## 2019-05-10 NOTE — PLAN OF CARE
Pt discharged @ 1000 with all belongings and 30 day supply of medications to adult shelter [facility] as transported by [employer] white cab. Pt escorted by staff to cab. Pt denies any suicidal thoughts and reports feeling safe for discharge. Pt denies auditory hallucinations this morning. All discharge and medications instructions reviewed. All aftercare appointments reviewed.

## 2023-01-16 ENCOUNTER — OFFICE VISIT (OUTPATIENT)
Dept: URBAN - METROPOLITAN AREA CLINIC 43 | Facility: CLINIC | Age: 58
End: 2023-01-16
Payer: COMMERCIAL

## 2023-01-16 DIAGNOSIS — H25.13 AGE-RELATED NUCLEAR CATARACT, BILATERAL: ICD-10-CM

## 2023-01-16 DIAGNOSIS — H40.033 ANATOMICAL NARROW ANGLE, BILATERAL: Primary | ICD-10-CM

## 2023-01-16 PROCEDURE — 92020 GONIOSCOPY: CPT

## 2023-01-16 PROCEDURE — 99204 OFFICE O/P NEW MOD 45 MIN: CPT

## 2023-01-16 ASSESSMENT — KERATOMETRY
OD: 42.63
OS: 42.13

## 2023-01-16 ASSESSMENT — VISUAL ACUITY
OD: 20/30
OS: 20/40

## 2023-01-16 ASSESSMENT — INTRAOCULAR PRESSURE
OD: 14
OS: 14

## 2023-01-16 NOTE — IMPRESSION/PLAN
Impression: Age-related nuclear cataract, bilateral: H25.13. Plan:  Impacting vision. Will eval s/p LPI OU.  Monitor

## 2023-01-16 NOTE — IMPRESSION/PLAN
Impression: Anatomical narrow angle, bilateral: H40.033. Plan:  DWP unable to complete full dilated exam today due to risk of angle closure. Healthy appearing Felipe Rajwinder 73 OU. Occludable on gonio.  will refer back after LPI for Cat jordan

## 2023-02-09 ENCOUNTER — OFFICE VISIT (OUTPATIENT)
Dept: URBAN - METROPOLITAN AREA CLINIC 10 | Facility: CLINIC | Age: 58
End: 2023-02-09
Payer: COMMERCIAL

## 2023-02-09 DIAGNOSIS — T26.61XD CHEMICAL BURN OF CORNEA OF RIGHT EYE, SUBSEQUENT ENCOUNTER: Primary | ICD-10-CM

## 2023-02-09 PROCEDURE — 99213 OFFICE O/P EST LOW 20 MIN: CPT | Performed by: OPTOMETRIST
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
